# Patient Record
Sex: FEMALE | Race: BLACK OR AFRICAN AMERICAN | NOT HISPANIC OR LATINO
[De-identification: names, ages, dates, MRNs, and addresses within clinical notes are randomized per-mention and may not be internally consistent; named-entity substitution may affect disease eponyms.]

---

## 2023-09-29 ENCOUNTER — P2P PATIENT RECORD (OUTPATIENT)
Age: 48
End: 2023-09-29

## 2023-09-29 ENCOUNTER — WEB ENCOUNTER (OUTPATIENT)
Dept: URBAN - METROPOLITAN AREA CLINIC 84 | Facility: CLINIC | Age: 48
End: 2023-09-29

## 2023-09-29 ENCOUNTER — OFFICE VISIT (OUTPATIENT)
Dept: URBAN - METROPOLITAN AREA CLINIC 84 | Facility: CLINIC | Age: 48
End: 2023-09-29
Payer: COMMERCIAL

## 2023-09-29 VITALS
SYSTOLIC BLOOD PRESSURE: 99 MMHG | HEART RATE: 102 BPM | WEIGHT: 151 LBS | BODY MASS INDEX: 29.64 KG/M2 | TEMPERATURE: 98.8 F | DIASTOLIC BLOOD PRESSURE: 63 MMHG | HEIGHT: 60 IN

## 2023-09-29 DIAGNOSIS — K86.3 PANCREATIC PSEUDOCYST: ICD-10-CM

## 2023-09-29 DIAGNOSIS — F10.10 ALCOHOL ABUSE: ICD-10-CM

## 2023-09-29 PROBLEM — 15167005: Status: ACTIVE | Noted: 2023-09-29

## 2023-09-29 PROCEDURE — 99204 OFFICE O/P NEW MOD 45 MIN: CPT | Performed by: INTERNAL MEDICINE

## 2023-09-29 RX ORDER — PANCRELIPASE 30000; 6000; 19000 [USP'U]/1; [USP'U]/1; [USP'U]/1
AS DIRECTED CAPSULE, DELAYED RELEASE PELLETS ORAL TWICE A DAY
Qty: 180 | Refills: 3 | OUTPATIENT
Start: 2023-09-29

## 2023-09-29 RX ORDER — TRAMADOL HYDROCHLORIDE 50 MG/1
1 TABLET AS NEEDED TABLET, FILM COATED ORAL ONCE A DAY
Status: ACTIVE | COMMUNITY

## 2023-09-29 NOTE — HPI-TODAY'S VISIT:
47 yo pt presents for f/u s/p CT scan showing 4.6 cm pancreatic pseudocyst the body. She is much improved. Pt admits to etoh abuse for about 1 yr after her child  but now plans to abstain.

## 2023-10-02 ENCOUNTER — TELEPHONE ENCOUNTER (OUTPATIENT)
Dept: URBAN - METROPOLITAN AREA CLINIC 25 | Facility: CLINIC | Age: 48
End: 2023-10-02

## 2023-10-19 ENCOUNTER — TELEPHONE ENCOUNTER (OUTPATIENT)
Dept: URBAN - METROPOLITAN AREA CLINIC 84 | Facility: CLINIC | Age: 48
End: 2023-10-19

## 2023-10-25 ENCOUNTER — TELEPHONE ENCOUNTER (OUTPATIENT)
Dept: URBAN - METROPOLITAN AREA CLINIC 84 | Facility: CLINIC | Age: 48
End: 2023-10-25

## 2023-11-10 ENCOUNTER — TELEPHONE ENCOUNTER (OUTPATIENT)
Dept: URBAN - METROPOLITAN AREA CLINIC 92 | Facility: CLINIC | Age: 48
End: 2023-11-10

## 2023-11-13 ENCOUNTER — TELEPHONE ENCOUNTER (OUTPATIENT)
Dept: URBAN - METROPOLITAN AREA CLINIC 25 | Facility: CLINIC | Age: 48
End: 2023-11-13

## 2024-02-05 ENCOUNTER — OV EP (OUTPATIENT)
Dept: URBAN - METROPOLITAN AREA CLINIC 84 | Facility: CLINIC | Age: 49
End: 2024-02-05
Payer: COMMERCIAL

## 2024-02-05 VITALS
WEIGHT: 140 LBS | HEART RATE: 79 BPM | TEMPERATURE: 98 F | HEIGHT: 60 IN | SYSTOLIC BLOOD PRESSURE: 117 MMHG | BODY MASS INDEX: 27.48 KG/M2 | DIASTOLIC BLOOD PRESSURE: 70 MMHG

## 2024-02-05 DIAGNOSIS — K86.3 PANCREATIC PSEUDOCYST: ICD-10-CM

## 2024-02-05 PROCEDURE — 99213 OFFICE O/P EST LOW 20 MIN: CPT | Performed by: INTERNAL MEDICINE

## 2024-02-05 RX ORDER — FERROUS SULFATE 325(65) MG
1 TABLET TABLET ORAL
Status: ACTIVE | COMMUNITY

## 2024-02-05 RX ORDER — PANCRELIPASE 30000; 6000; 19000 [USP'U]/1; [USP'U]/1; [USP'U]/1
AS DIRECTED CAPSULE, DELAYED RELEASE PELLETS ORAL TWICE A DAY
Qty: 180 | Refills: 3 | Status: ACTIVE | COMMUNITY
Start: 2023-09-29

## 2024-02-05 RX ORDER — TRAMADOL HYDROCHLORIDE 50 MG/1
1 TABLET AS NEEDED TABLET, FILM COATED ORAL ONCE A DAY
Status: ACTIVE | COMMUNITY

## 2024-04-23 ENCOUNTER — LAB (OUTPATIENT)
Dept: URBAN - METROPOLITAN AREA CLINIC 84 | Facility: CLINIC | Age: 49
End: 2024-04-23